# Patient Record
Sex: FEMALE | URBAN - METROPOLITAN AREA
[De-identification: names, ages, dates, MRNs, and addresses within clinical notes are randomized per-mention and may not be internally consistent; named-entity substitution may affect disease eponyms.]

---

## 2017-08-10 DIAGNOSIS — I34.0 MITRAL VALVE INSUFFICIENCY, UNSPECIFIED ETIOLOGY: Primary | ICD-10-CM

## 2017-08-15 ENCOUNTER — PRE VISIT (OUTPATIENT)
Dept: CARDIOLOGY | Facility: CLINIC | Age: 82
End: 2017-08-15

## 2017-08-16 NOTE — TELEPHONE ENCOUNTER
Patient called and canceled appointment with Dr Carias on 08/17 as her insurance would not cover the appointment.  Will scan any information and films in our records for future use if [email or web address]    Amina Silver, RNCC  Cardiothoracic Surgery   O) [phone]